# Patient Record
Sex: FEMALE | Race: BLACK OR AFRICAN AMERICAN | Employment: OTHER | URBAN - METROPOLITAN AREA
[De-identification: names, ages, dates, MRNs, and addresses within clinical notes are randomized per-mention and may not be internally consistent; named-entity substitution may affect disease eponyms.]

---

## 2019-02-19 ENCOUNTER — OFFICE VISIT (OUTPATIENT)
Dept: ENDOCRINOLOGY | Age: 74
End: 2019-02-19

## 2019-02-19 VITALS
BODY MASS INDEX: 24.29 KG/M2 | HEIGHT: 65 IN | OXYGEN SATURATION: 98 % | TEMPERATURE: 97.9 F | WEIGHT: 145.8 LBS | RESPIRATION RATE: 18 BRPM | SYSTOLIC BLOOD PRESSURE: 155 MMHG | DIASTOLIC BLOOD PRESSURE: 76 MMHG | HEART RATE: 83 BPM

## 2019-02-19 DIAGNOSIS — E05.90 HYPERTHYROIDISM: Primary | ICD-10-CM

## 2019-02-19 DIAGNOSIS — E05.90 SUBCLINICAL HYPERTHYROIDISM: Primary | ICD-10-CM

## 2019-02-19 RX ORDER — METHIMAZOLE 5 MG/1
2.5 TABLET ORAL DAILY
Qty: 15 TAB | Refills: 11 | Status: SHIPPED | OUTPATIENT
Start: 2019-02-19 | End: 2020-03-02 | Stop reason: SDUPTHER

## 2019-02-19 RX ORDER — IBUPROFEN 200 MG
TABLET ORAL
COMMUNITY

## 2019-02-19 NOTE — PROGRESS NOTES
History of present illness Houston Osler is a 68 y.o. female  here for evaluation of thyroid. She has seen me in 2013 for subclinical hyper thyroidism and did not follow-up She continues to have abnormal thyroid function tests , low TSH with normal free T4 Complains of hot flashes which has been chronic, no worsening. No relation to the food, exercise No change in the size of the neck or neck pain. No dysphagia,dysphonia or dyspnea. Occasional  nervousness,shakiness,palpitations No diarrhea . Mother has thyroid disease. No FH of thyroid cancer Past Medical History:  
Diagnosis Date  Arthritis 11/22/2010  Chronic pain 11/22/2010  Fractured bone Traumatic fracture in her 25s  Heart murmur 11/22/2010  
 HTN (hypertension) 11/22/2010  Hypothyroidism  Osteoporosis 8/6/2012 Current Outpatient Medications Medication Sig  ibuprofen (MOTRIN) 200 mg tablet Take  by mouth.  amLODIPine (NORVASC) 10 mg tablet Take 1 tablet by mouth  daily  benazepril (LOTENSIN) 40 mg tablet Take 1 tablet by mouth  daily  gabapentin (NEURONTIN) 300 mg capsule Take 1 Cap by mouth three (3) times daily.  valacyclovir (VALTREX) 500 mg tablet TAKE ONE TABLET BY MOUTH TWICE DAILY  aspirin 81 mg chewable tablet Take 81 mg by mouth daily.  acetaminophen-codeine (TYLENOL #4) 300-60 mg per tablet Take 1 Tab by mouth two (2) times daily as needed for Pain. Max Daily Amount: 2 Tabs. For moderate-severe back or neck pain No current facility-administered medications for this visit. Review of Systems: 
- Constitutional Symptoms: no fevers, chills, weight loss - Eyes: no blurry vision or double vision - Cardiovascular: no chest pain, no palpitations - Respiratory: no cough or shortness of breath 
- Gastrointestinal: no dysphagia or abdominal pain - Musculoskeletal: no joint pains or weakness - Integumentary: no rashes - Neurological: no numbness, tingling, or headaches - Psychiatric: no depression or anxiety - Endocrine:  no polyuria or polydipsia Physical Examination: - Blood pressure 155/76, pulse 83, temperature 97.9 °F (36.6 °C), temperature source Oral, resp. rate 18, height 5' 5\" (1.651 m), weight 145 lb 12.8 oz (66.1 kg), SpO2 98 %. Body mass index is 24.26 kg/m². - General: pleasant, no distress, good eye contact 
- HEENT: no exopthalmos, no periorbital edema, no scleral/conjunctival injection, EOMI, no lid lag or stare 
- Neck: supple, no thyromegaly, nodules,no cervical lymph nodes,  
- Cardiovascular: regular, normal rate, normal S1 and S2, no murmurs - Respiratory: clear to auscultation bilaterally - Gastrointestinal: soft, nontender, nondistended, BS + 
- Musculoskeletal: no proximal muscle weakness in upper or lower extremities - Integumentary: no tremors, no edema 
- Neurological: alert and oriented - Psychiatric: normal mood and affect 
- Skin - normal turgor Data Reviewed:  
  
Lab Results Component Value Date/Time TSH 0.536 04/17/2014 08:25 AM  
 T4, Free 1.33 12/26/2013 11:19 AM  
  
TSH 0.12  FT4 nl 
 
[x] Reviewed labs Assessment/Plan: 
 
Subclinical hyperthyroidism Given chronic changes in the thyroid function tests thyroiditis is unlikely, nodular goiter is a possibility but could not appreciate any nodule. Discussed increase risk of Atrial fibrillation,fractures and she has hx of osteoporosis. Ultrasound thyroid plus or minus thyroid uptake and scan to evaluate for autonomous thyroid nodule Discussed the treatment options for toxic nodule being radioactive iodine therapy, if not methimazole Osteoporosis DEXA 2012- Denies having any osteoporosis now No fractures

## 2019-02-19 NOTE — PATIENT INSTRUCTIONS
Potential side effects of methimazole are rash,low blood count and rarely liver inflammation. If you get high grade fever,bad sorethroat,or sores in the mouth ,please call the office for blood tests. If white blood count is low,the methimazole should be stopped and the counts will normalize in 7 to 10 days.

## 2019-02-19 NOTE — PROGRESS NOTES
1. Have you been to the ER, urgent care clinic since your last visit? Hospitalized since your last visit?no 2. Have you seen or consulted any other health care providers outside of the [address] [place] [address] since your last visit? Include any pap smears or colon screening. No 
 
Chief Complaint Patient presents with  New Patient PATIENT last seen here in 2013-Here today for Thyroid Learning assessment complete Abuse Screening Questionnaire 2/19/2019 Do you ever feel afraid of your partner? Monica Aid Are you in a relationship with someone who physically or mentally threatens you? Monica Aid Is it safe for you to go home? Beth Mauro Wt Readings from Last 3 Encounters:  
02/19/19 145 lb 12.8 oz (66.1 kg) 04/03/15 141 lb (64 kg) 03/19/15 141 lb (64 kg) Temp Readings from Last 3 Encounters:  
02/19/19 97.9 °F (36.6 °C) (Oral) 04/03/15 98 °F (36.7 °C) (Oral) 03/19/15 98.1 °F (36.7 °C) (Oral) BP Readings from Last 3 Encounters:  
02/19/19 155/76  
04/03/15 136/70  
03/19/15 126/76 Pulse Readings from Last 3 Encounters:  
02/19/19 83  
04/03/15 87  
03/19/15 92

## 2019-07-09 PROBLEM — E05.90 SUBCLINICAL HYPERTHYROIDISM: Status: ACTIVE | Noted: 2019-07-09

## 2020-01-23 DIAGNOSIS — E05.90 HYPERTHYROIDISM: ICD-10-CM

## 2020-01-23 RX ORDER — METHIMAZOLE 5 MG/1
2.5 TABLET ORAL DAILY
Qty: 45 TAB | Refills: 0 | OUTPATIENT
Start: 2020-01-23

## 2020-02-28 DIAGNOSIS — E05.90 HYPERTHYROIDISM: ICD-10-CM

## 2020-02-28 NOTE — TELEPHONE ENCOUNTER
----- Message from Kaity Minor sent at 2/28/2020 11:30 AM EST -----  Regarding: Dr. Adithya Mondragonpasture: [phone] (if not patient): n/a  Relationship of caller (if not patient): n/a  Best contact number(s): [phone]  Name of medication and dosage if known: \"Methimazole 5mg\"   Is patient out of this medication (yes/no): only 4 halves left   Pharmacy name: [facility] [address] [place] [address] listed in chart? (yes/no): yes  Pharmacy phone number: [phone]  Date of last visit: 2/19/19  Details to clarify the request: n/a

## 2020-03-02 RX ORDER — METHIMAZOLE 5 MG/1
2.5 TABLET ORAL DAILY
Qty: 15 TAB | Refills: 1 | Status: SHIPPED | OUTPATIENT
Start: 2020-03-02

## 2021-05-18 ENCOUNTER — HOSPITAL ENCOUNTER (EMERGENCY)
Age: 76
Discharge: HOME | End: 2021-05-18
Attending: EMERGENCY MEDICINE
Payer: MEDICARE

## 2021-05-18 ENCOUNTER — APPOINTMENT (OUTPATIENT)
Dept: GENERAL RADIOLOGY | Age: 76
End: 2021-05-18
Attending: EMERGENCY MEDICINE
Payer: MEDICARE

## 2021-05-18 VITALS
HEART RATE: 72 BPM | TEMPERATURE: 97.8 F | SYSTOLIC BLOOD PRESSURE: 145 MMHG | DIASTOLIC BLOOD PRESSURE: 87 MMHG | OXYGEN SATURATION: 100 % | RESPIRATION RATE: 15 BRPM
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
## 2021-05-18 DIAGNOSIS — S80.02XA CONTUSION OF LEFT KNEE, INITIAL ENCOUNTER: ICD-10-CM

## 2021-05-18 DIAGNOSIS — S46.911A STRAIN OF RIGHT SHOULDER, INITIAL ENCOUNTER: ICD-10-CM

## 2021-05-18 PROCEDURE — 73562 X-RAY EXAM OF KNEE 3: CPT

## 2021-05-18 PROCEDURE — 99283 EMERGENCY DEPT VISIT LOW MDM: CPT

## 2021-05-18 PROCEDURE — 72050 X-RAY EXAM NECK SPINE 4/5VWS: CPT

## 2021-05-18 PROCEDURE — 73030 X-RAY EXAM OF SHOULDER: CPT

## 2021-05-18 PROCEDURE — 73080 X-RAY EXAM OF ELBOW: CPT

## 2021-05-18 NOTE — ED PROVIDER NOTES
Ms. Anmol Schumacher is a 67yo female who presents to the ER after a fall. She said that she was in a store. There was something on the store that had wires around it. She said that she tripped on the wires. She had somewhat caught her self at that time. As she is attempting to get her self out of the wires she tripped over another crate that was at beside it and fell onto her right side. She is able to get up on her own. She complains of pain at her left knee, right shoulder, right elbow, and neck. No numbness, tingling, weakness. No chest pain or trouble breathing. She not hit her head. No loss of consciousness. She said that her knee pain is getting a little bit better. She is able to walk without difficulty. She says she felt well prior to the fall. She denies any other complaints.            Past Medical History:   Diagnosis Date    Arthritis 11/22/2010    Chronic pain 11/22/2010    Fractured bone     Traumatic fracture in her 25s    Heart murmur 11/22/2010    HTN (hypertension) 11/22/2010    Hypothyroidism     Osteoporosis 8/6/2012       Past Surgical History:   Procedure Laterality Date    HX OOPHORECTOMY  1982    unilateral    HX OVARIAN CYST REMOVAL  1985    HX TUBAL LIGATION      unilateral         Family History:   Problem Relation Age of Onset    Heart Disease Father     Heart Disease Paternal Grandmother        Social History     Socioeconomic History    Marital status:      Spouse name: Not on file    Number of children: Not on file    Years of education: Not on file    Highest education level: Not on file   Occupational History    Not on file   Social Needs    Financial resource strain: Not on file    Food insecurity     Worry: Not on file     Inability: Not on file    Transportation needs     Medical: Not on file     Non-medical: Not on file   Tobacco Use    Smoking status: Current Some Day Smoker    Smokeless tobacco: Never Used   Substance and Sexual Activity    Alcohol use: Yes     Alcohol/week: 0.8 standard drinks     Types: 1 Glasses of wine per week     Frequency: Monthly or less    Drug use: No    Sexual activity: Not on file   Lifestyle    Physical activity     Days per week: Not on file     Minutes per session: Not on file    Stress: Not on file   Relationships    Social connections     Talks on phone: Not on file     Gets together: Not on file     Attends Restorationist service: Not on file     Active member of club or organization: Not on file     Attends meetings of clubs or organizations: Not on file     Relationship status: Not on file    Intimate partner violence     Fear of current or ex partner: Not on file     Emotionally abused: Not on file     Physically abused: Not on file     Forced sexual activity: Not on file   Other Topics Concern    Not on file   Social History Narrative    Not on file         ALLERGIES: Pcn [penicillins] and Tomato    Review of Systems   Constitutional: Negative for chills and fever. HENT: Negative for rhinorrhea and sore throat. Respiratory: Negative for cough and shortness of breath. Cardiovascular: Negative for chest pain. Gastrointestinal: Negative for abdominal pain, diarrhea, nausea and vomiting. Genitourinary: Negative for dysuria and urgency. Musculoskeletal:        Knee pain, elbow pain, shoulder pain, neck pain   Skin: Negative for rash. Neurological: Negative for dizziness, weakness and light-headedness. Vitals:    05/18/21 1519   BP: (!) 165/83   Pulse: (!) 106   Resp: 18   Temp: 97.8 °F (36.6 °C)   SpO2: 98%            Physical Exam     Vital signs reviewed. Nursing notes reviewed.     Const:  No acute distress, well developed, well nourished  Head:  Atraumatic, normocephalic  Eyes:  PERRL, conjunctiva normal, no scleral icterus  Neck:  Supple, trachea midline  Cardiovascular: Regular rate  Resp:  No resp distress, no increased work of breathing  Abd:  Soft, non-tender, non-distended  MSK:  No pedal edema, normal ROM, minimal tenderness of the left knee to palpation, full range of motion of the left knee, minimal tenderness to the right shoulder to palpation,, no pain with range of motion of the right shoulder or right elbow, no C, T, or L-spine tenderness  Neuro:  Alert and oriented x3, no cranial nerve defect  Skin:  Warm, dry, intact  Psych: normal mood and affect, behavior is normal, judgement and thought content is normal          MDM  Number of Diagnoses or Management Options     Amount and/or Complexity of Data Reviewed  Clinical lab tests: ordered and reviewed  Tests in the radiology section of CPT®: ordered and reviewed  Review and summarize past medical records: yes    Patient Progress  Patient progress: stable          Ms. Nydia Bernal is a 67yo female who presents to the ER with complaints of pain after a fall. No fx on xray. NO signs/sx of cord injury or compression. Likely contusions. Pt. To f/u with her PCP or return to the ER with new or worsening sx.       Procedures

## 2021-05-18 NOTE — ED TRIAGE NOTES
Patient arrives ambulatory to ED complaining of right leg and arm pain following a trip and fall over wiring at store.  Patient ambulatory after fall, but complaining of pain

## 2022-03-19 PROBLEM — E05.90 SUBCLINICAL HYPERTHYROIDISM: Status: ACTIVE | Noted: 2019-07-09

## 2023-05-24 RX ORDER — IBUPROFEN 200 MG
TABLET ORAL
COMMUNITY

## 2023-05-24 RX ORDER — ASPIRIN 81 MG/1
81 TABLET, CHEWABLE ORAL DAILY
COMMUNITY

## 2023-05-24 RX ORDER — ACETAMINOPHEN AND CODEINE PHOSPHATE 300; 60 MG/1; MG/1
1 TABLET ORAL 2 TIMES DAILY PRN
COMMUNITY
Start: 2015-04-03

## 2023-05-24 RX ORDER — METHIMAZOLE 5 MG/1
2.5 TABLET ORAL DAILY
COMMUNITY
Start: 2020-03-02

## 2023-05-24 RX ORDER — GABAPENTIN 300 MG/1
300 CAPSULE ORAL 3 TIMES DAILY
COMMUNITY
Start: 2015-03-04

## 2023-05-24 RX ORDER — BENAZEPRIL HYDROCHLORIDE 40 MG/1
1 TABLET, FILM COATED ORAL DAILY
COMMUNITY
Start: 2015-03-19

## 2023-05-24 RX ORDER — AMLODIPINE BESYLATE 10 MG/1
1 TABLET ORAL DAILY
COMMUNITY
Start: 2015-03-19

## 2023-05-24 RX ORDER — VALACYCLOVIR HYDROCHLORIDE 500 MG/1
1 TABLET, FILM COATED ORAL 2 TIMES DAILY
COMMUNITY
Start: 2014-10-21